# Patient Record
(demographics unavailable — no encounter records)

---

## 2024-11-11 NOTE — HISTORY OF PRESENT ILLNESS
[FreeTextEntry1] : November 20th, 2024 A 33 year old presents for an initial evaluation for rheumatology. Referred by

## 2024-11-20 NOTE — ADDENDUM
[FreeTextEntry1] :  I, Kassidy Pagan, acted solely as a scribe for Dr. Brenda Collins, direction and personally dictated by me on 11/20/2024. I have reviewed the chart and agree that the record accurately reflects my personal performance of the history, physical exam, assessment, and plan. I have also personally directed, reviewed, and agreed with the chart.

## 2024-11-20 NOTE — PHYSICAL EXAM
[General Appearance - Alert] : alert [General Appearance - In No Acute Distress] : in no acute distress [Edema] : there was no peripheral edema [Abnormal Walk] : normal gait [Nail Clubbing] : no clubbing  or cyanosis of the fingernails [Musculoskeletal - Swelling] : no joint swelling seen [Motor Tone] : muscle strength and tone were normal [Oriented To Time, Place, And Person] : oriented to person, place, and time [Impaired Insight] : insight and judgment were intact [Affect] : the affect was normal [General Appearance - Well-Appearing] : healthy appearing [Examination Of The Oral Cavity] : the lips and gums were normal [Oropharynx] : the oropharynx was normal [Respiration, Rhythm And Depth] : normal respiratory rhythm and effort [Exaggerated Use Of Accessory Muscles For Inspiration] : no accessory muscle use [Auscultation Breath Sounds / Voice Sounds] : lungs were clear to auscultation bilaterally [Heart Rate And Rhythm] : heart rate was normal and rhythm regular [Heart Sounds] : normal S1 and S2 [Murmurs] : no murmurs [No Spinal Tenderness] : no spinal tenderness [] : no rash [Mood] : the mood was normal [FreeTextEntry1] : No active synovitis of the upper and lower extremities bilaterally. Patient unable to touch toes in lumbar flexion.

## 2024-11-20 NOTE — ASSESSMENT
[FreeTextEntry1] : 33-year-old presents for an initial evaluation for rheumatology, referred by Dr. Jodi Rodriguez.  Patient with low back pain, nonradicular, noninflammatory in etiology, also with intermittent hip and knee pain.  Back pain has been present for years, not present for the past 3 to 4 weeks, currently feeling well.  Patient with strong family history of spinal stenosis, although patient quite young to have this presentation in association with spinal stenosis, initial labs with TIAGO 1: 160 as well as rheumatoid factor of 17.  ESR in the normal range.  Patient does not report a personal or family history of psoriasis.  When back pain is active, patient reports morning stiffness with improvement with NSAIDs as well as cold and heat to the area alternating.  At this time, patient does not meet criteria for an underlying connective tissue disease, however given positive TIAGO will obtain further serologies today.  Given back symptoms, will obtain x-rays of lumbar spine as well as pelvis to evaluate for sacroiliitis given mild right SI joint tenderness on exam, will also check HLA-B27 as well today.  Further management pending results.

## 2024-11-20 NOTE — DATA REVIEWED
[FreeTextEntry1] : October 22nd, 2024 TIAGO- positive, 1:160  ESR- 12 Uric Acid- 3.7  ASO-54 RF-17

## 2024-11-20 NOTE — ASSESSMENT
[FreeTextEntry1] : 33-year-old presents for an initial evaluation for rheumatology, referred by Dr. Jodi Rodirguez.  Patient with low back pain, nonradicular, noninflammatory in etiology, also with intermittent hip and knee pain.  Back pain has been present for years, not present for the past 3 to 4 weeks, currently feeling well.  Patient with strong family history of spinal stenosis, although patient quite young to have this presentation in association with spinal stenosis, initial labs with TIAGO 1: 160 as well as rheumatoid factor of 17.  ESR in the normal range.  Patient does not report a personal or family history of psoriasis.  When back pain is active, patient reports morning stiffness with improvement with NSAIDs as well as cold and heat to the area alternating.  At this time, patient does not meet criteria for an underlying connective tissue disease, however given positive TIAGO will obtain further serologies today.  Given back symptoms, will obtain x-rays of lumbar spine as well as pelvis to evaluate for sacroiliitis given mild right SI joint tenderness on exam, will also check HLA-B27 as well today.  Further management pending results.

## 2024-11-20 NOTE — HISTORY OF PRESENT ILLNESS
[FreeTextEntry1] : November 20, 2024 33 year old presents for an initial evaluation for rheumatology. Referred by Dr. Jodi Rodriguez Patient with pain in the knees bilaterally and midline lower back for the past year  Reports lower back pain preventing from full night of sleep during flare ups  Pain is worse in the morning, alleviating with movement  When 18-20 years old, back pain would radiate down to the lower extremities, no longer with radiculopathy At this time without pain, back has been feeling well for the past 3-4 weeks Taking advil for pain relief, adhering to cold compresses alternating with heat and stretching  Currently taking claritin prn, no other medications Denies known FHx of arthritis, reports family history of spinal stenosis (grandma and sister), degenerative disc disease  Hx of eczema, denies history of psoriasis, denies Hx of thyroid related health issues  Walks 2-4 miles daily, attends yoga  Denies hand joint pain Denies swelling of the joints  Denies photosensitivity, reports intermittent neck rashes in extreme heat  Denies sicca symptoms Denies alopecia Denies Raynaud's symptoms No plans for pregnancy soon Labs from October 2024 reviewed October 22nd, 2024 TIAGO- positive, 1:160 ESR- 12 Uric Acid- 3.7 ASO-54 RF-17